# Patient Record
Sex: FEMALE | Race: WHITE | NOT HISPANIC OR LATINO | Employment: OTHER | URBAN - METROPOLITAN AREA
[De-identification: names, ages, dates, MRNs, and addresses within clinical notes are randomized per-mention and may not be internally consistent; named-entity substitution may affect disease eponyms.]

---

## 2021-05-25 ENCOUNTER — RECORDS - HEALTHEAST (OUTPATIENT)
Dept: ADMINISTRATIVE | Facility: CLINIC | Age: 71
End: 2021-05-25

## 2022-11-19 ENCOUNTER — HOSPITAL ENCOUNTER (INPATIENT)
Facility: CLINIC | Age: 72
LOS: 2 days | Discharge: HOME | DRG: 281 | End: 2022-11-21
Attending: INTERNAL MEDICINE | Admitting: INTERNAL MEDICINE
Payer: MEDICARE

## 2022-11-19 ENCOUNTER — APPOINTMENT (OUTPATIENT)
Dept: CARDIOLOGY | Facility: CLINIC | Age: 72
DRG: 281 | End: 2022-11-19
Attending: PHYSICIAN ASSISTANT
Payer: MEDICARE

## 2022-11-19 DIAGNOSIS — I21.4 NSTEMI (NON-ST ELEVATED MYOCARDIAL INFARCTION) (H): ICD-10-CM

## 2022-11-19 DIAGNOSIS — I10 UNCONTROLLED HYPERTENSION: Primary | ICD-10-CM

## 2022-11-19 LAB
ALBUMIN SERPL-MCNC: 3.9 G/DL (ref 3.4–5)
ALP SERPL-CCNC: 86 U/L (ref 40–150)
ALT SERPL W P-5'-P-CCNC: 27 U/L (ref 0–50)
ANION GAP SERPL CALCULATED.3IONS-SCNC: 4 MMOL/L (ref 3–14)
AST SERPL W P-5'-P-CCNC: 19 U/L (ref 0–45)
BILIRUB SERPL-MCNC: 0.2 MG/DL (ref 0.2–1.3)
BUN SERPL-MCNC: 8 MG/DL (ref 7–30)
CALCIUM SERPL-MCNC: 9.5 MG/DL (ref 8.5–10.1)
CHLORIDE BLD-SCNC: 106 MMOL/L (ref 94–109)
CO2 SERPL-SCNC: 29 MMOL/L (ref 20–32)
CREAT SERPL-MCNC: 0.54 MG/DL (ref 0.52–1.04)
ERYTHROCYTE [DISTWIDTH] IN BLOOD BY AUTOMATED COUNT: 12.3 % (ref 10–15)
GFR SERPL CREATININE-BSD FRML MDRD: >90 ML/MIN/1.73M2
GLUCOSE BLD-MCNC: 118 MG/DL (ref 70–99)
HCT VFR BLD AUTO: 41.2 % (ref 35–47)
HGB BLD-MCNC: 14 G/DL (ref 11.7–15.7)
LVEF ECHO: NORMAL
MCH RBC QN AUTO: 31 PG (ref 26.5–33)
MCHC RBC AUTO-ENTMCNC: 34 G/DL (ref 31.5–36.5)
MCV RBC AUTO: 91 FL (ref 78–100)
PLATELET # BLD AUTO: 340 10E3/UL (ref 150–450)
POTASSIUM BLD-SCNC: 4.3 MMOL/L (ref 3.4–5.3)
PROT SERPL-MCNC: 7.8 G/DL (ref 6.8–8.8)
RBC # BLD AUTO: 4.51 10E6/UL (ref 3.8–5.2)
SARS-COV-2 RNA RESP QL NAA+PROBE: NEGATIVE
SODIUM SERPL-SCNC: 139 MMOL/L (ref 133–144)
TROPONIN I SERPL HS-MCNC: 183 NG/L
TROPONIN I SERPL HS-MCNC: 256 NG/L
UFH PPP CHRO-ACNC: 0.13 IU/ML
UFH PPP CHRO-ACNC: 0.39 IU/ML
WBC # BLD AUTO: 7.5 10E3/UL (ref 4–11)

## 2022-11-19 PROCEDURE — 84484 ASSAY OF TROPONIN QUANT: CPT | Performed by: PHYSICIAN ASSISTANT

## 2022-11-19 PROCEDURE — 93010 ELECTROCARDIOGRAM REPORT: CPT | Performed by: INTERNAL MEDICINE

## 2022-11-19 PROCEDURE — 250N000013 HC RX MED GY IP 250 OP 250 PS 637: Performed by: PHYSICIAN ASSISTANT

## 2022-11-19 PROCEDURE — 84484 ASSAY OF TROPONIN QUANT: CPT | Performed by: INTERNAL MEDICINE

## 2022-11-19 PROCEDURE — 255N000002 HC RX 255 OP 636: Performed by: INTERNAL MEDICINE

## 2022-11-19 PROCEDURE — 36415 COLL VENOUS BLD VENIPUNCTURE: CPT | Performed by: INTERNAL MEDICINE

## 2022-11-19 PROCEDURE — 85520 HEPARIN ASSAY: CPT | Performed by: INTERNAL MEDICINE

## 2022-11-19 PROCEDURE — 93005 ELECTROCARDIOGRAM TRACING: CPT

## 2022-11-19 PROCEDURE — 36415 COLL VENOUS BLD VENIPUNCTURE: CPT | Performed by: PHYSICIAN ASSISTANT

## 2022-11-19 PROCEDURE — 210N000002 HC R&B HEART CARE

## 2022-11-19 PROCEDURE — 250N000011 HC RX IP 250 OP 636: Performed by: PHYSICIAN ASSISTANT

## 2022-11-19 PROCEDURE — 99222 1ST HOSP IP/OBS MODERATE 55: CPT | Performed by: INTERNAL MEDICINE

## 2022-11-19 PROCEDURE — 93306 TTE W/DOPPLER COMPLETE: CPT | Mod: 26 | Performed by: INTERNAL MEDICINE

## 2022-11-19 PROCEDURE — 999N000208 ECHOCARDIOGRAM COMPLETE

## 2022-11-19 PROCEDURE — 99223 1ST HOSP IP/OBS HIGH 75: CPT | Mod: AI | Performed by: PHYSICIAN ASSISTANT

## 2022-11-19 PROCEDURE — 85027 COMPLETE CBC AUTOMATED: CPT | Performed by: PHYSICIAN ASSISTANT

## 2022-11-19 PROCEDURE — 80053 COMPREHEN METABOLIC PANEL: CPT | Performed by: PHYSICIAN ASSISTANT

## 2022-11-19 PROCEDURE — U0005 INFEC AGEN DETEC AMPLI PROBE: HCPCS | Performed by: PHYSICIAN ASSISTANT

## 2022-11-19 RX ORDER — ONDANSETRON 4 MG/1
4 TABLET, ORALLY DISINTEGRATING ORAL EVERY 6 HOURS PRN
Status: DISCONTINUED | OUTPATIENT
Start: 2022-11-19 | End: 2022-11-21 | Stop reason: HOSPADM

## 2022-11-19 RX ORDER — CALCIUM CARBONATE 500 MG/1
1000 TABLET, CHEWABLE ORAL 4 TIMES DAILY PRN
Status: DISCONTINUED | OUTPATIENT
Start: 2022-11-19 | End: 2022-11-21 | Stop reason: HOSPADM

## 2022-11-19 RX ORDER — ZINC SULFATE 50(220)MG
220 CAPSULE ORAL DAILY
COMMUNITY

## 2022-11-19 RX ORDER — FLUTICASONE PROPIONATE 50 MCG
2 SPRAY, SUSPENSION (ML) NASAL AT BEDTIME
COMMUNITY

## 2022-11-19 RX ORDER — POLYETHYLENE GLYCOL 3350 17 G/17G
17 POWDER, FOR SOLUTION ORAL DAILY PRN
Status: DISCONTINUED | OUTPATIENT
Start: 2022-11-19 | End: 2022-11-21 | Stop reason: HOSPADM

## 2022-11-19 RX ORDER — PROCHLORPERAZINE MALEATE 5 MG
5 TABLET ORAL EVERY 6 HOURS PRN
Status: DISCONTINUED | OUTPATIENT
Start: 2022-11-19 | End: 2022-11-21 | Stop reason: HOSPADM

## 2022-11-19 RX ORDER — LISINOPRIL 10 MG/1
10 TABLET ORAL DAILY
COMMUNITY

## 2022-11-19 RX ORDER — AMOXICILLIN 250 MG
2 CAPSULE ORAL 2 TIMES DAILY PRN
Status: DISCONTINUED | OUTPATIENT
Start: 2022-11-19 | End: 2022-11-21 | Stop reason: HOSPADM

## 2022-11-19 RX ORDER — MULTIVITAMIN,THERAPEUTIC
1 TABLET ORAL DAILY
COMMUNITY

## 2022-11-19 RX ORDER — ASPIRIN 81 MG/1
81 TABLET ORAL DAILY
Status: DISCONTINUED | OUTPATIENT
Start: 2022-11-20 | End: 2022-11-21 | Stop reason: HOSPADM

## 2022-11-19 RX ORDER — PROCHLORPERAZINE 25 MG
12.5 SUPPOSITORY, RECTAL RECTAL EVERY 12 HOURS PRN
Status: DISCONTINUED | OUTPATIENT
Start: 2022-11-19 | End: 2022-11-21 | Stop reason: HOSPADM

## 2022-11-19 RX ORDER — AMOXICILLIN 500 MG
2400 CAPSULE ORAL DAILY
COMMUNITY

## 2022-11-19 RX ORDER — FLUTICASONE PROPIONATE 50 MCG
2 SPRAY, SUSPENSION (ML) NASAL AT BEDTIME
Status: DISCONTINUED | OUTPATIENT
Start: 2022-11-19 | End: 2022-11-21 | Stop reason: HOSPADM

## 2022-11-19 RX ORDER — ACETAMINOPHEN 650 MG/1
650 SUPPOSITORY RECTAL EVERY 4 HOURS PRN
Status: DISCONTINUED | OUTPATIENT
Start: 2022-11-19 | End: 2022-11-21 | Stop reason: HOSPADM

## 2022-11-19 RX ORDER — SERTRALINE HYDROCHLORIDE 100 MG/1
100 TABLET, FILM COATED ORAL DAILY
Status: DISCONTINUED | OUTPATIENT
Start: 2022-11-20 | End: 2022-11-21 | Stop reason: HOSPADM

## 2022-11-19 RX ORDER — LORAZEPAM 0.5 MG/1
.25-.5 TABLET ORAL EVERY 4 HOURS PRN
Status: DISCONTINUED | OUTPATIENT
Start: 2022-11-19 | End: 2022-11-21 | Stop reason: HOSPADM

## 2022-11-19 RX ORDER — HEPARIN SODIUM 10000 [USP'U]/100ML
0-5000 INJECTION, SOLUTION INTRAVENOUS CONTINUOUS
Status: DISCONTINUED | OUTPATIENT
Start: 2022-11-19 | End: 2022-11-21 | Stop reason: HOSPADM

## 2022-11-19 RX ORDER — LISINOPRIL 10 MG/1
10 TABLET ORAL DAILY
Status: DISCONTINUED | OUTPATIENT
Start: 2022-11-20 | End: 2022-11-21 | Stop reason: HOSPADM

## 2022-11-19 RX ORDER — ASCORBIC ACID 500 MG
1000 TABLET ORAL DAILY
COMMUNITY

## 2022-11-19 RX ORDER — WHEAT DEXTRIN/ASPARTAME 3 G/6 G
POWDER IN PACKET (EA) ORAL DAILY
COMMUNITY

## 2022-11-19 RX ORDER — SERTRALINE HYDROCHLORIDE 100 MG/1
100 TABLET, FILM COATED ORAL DAILY
COMMUNITY

## 2022-11-19 RX ORDER — ONDANSETRON 2 MG/ML
4 INJECTION INTRAMUSCULAR; INTRAVENOUS EVERY 6 HOURS PRN
Status: DISCONTINUED | OUTPATIENT
Start: 2022-11-19 | End: 2022-11-21 | Stop reason: HOSPADM

## 2022-11-19 RX ORDER — LIDOCAINE 40 MG/G
CREAM TOPICAL
Status: DISCONTINUED | OUTPATIENT
Start: 2022-11-19 | End: 2022-11-21 | Stop reason: HOSPADM

## 2022-11-19 RX ORDER — LORATADINE 10 MG/1
10 TABLET ORAL DAILY PRN
COMMUNITY

## 2022-11-19 RX ORDER — AMOXICILLIN 250 MG
1 CAPSULE ORAL 2 TIMES DAILY PRN
Status: DISCONTINUED | OUTPATIENT
Start: 2022-11-19 | End: 2022-11-21 | Stop reason: HOSPADM

## 2022-11-19 RX ORDER — ACETAMINOPHEN 325 MG/1
650 TABLET ORAL EVERY 4 HOURS PRN
Status: DISCONTINUED | OUTPATIENT
Start: 2022-11-19 | End: 2022-11-21 | Stop reason: HOSPADM

## 2022-11-19 RX ORDER — ASPIRIN 81 MG/1
81 TABLET ORAL DAILY
COMMUNITY

## 2022-11-19 RX ADMIN — HUMAN ALBUMIN MICROSPHERES AND PERFLUTREN 9 ML: 10; .22 INJECTION, SOLUTION INTRAVENOUS at 15:07

## 2022-11-19 RX ADMIN — FLUTICASONE PROPIONATE 2 SPRAY: 50 SPRAY, METERED NASAL at 23:29

## 2022-11-19 RX ADMIN — ACETAMINOPHEN 650 MG: 325 TABLET, FILM COATED ORAL at 21:05

## 2022-11-19 RX ADMIN — METOPROLOL TARTRATE 12.5 MG: 25 TABLET, FILM COATED ORAL at 21:05

## 2022-11-19 RX ADMIN — HEPARIN SODIUM 1050 UNITS/HR: 10000 INJECTION, SOLUTION INTRAVENOUS at 16:52

## 2022-11-19 RX ADMIN — ACETAMINOPHEN 650 MG: 325 TABLET, FILM COATED ORAL at 16:18

## 2022-11-19 ASSESSMENT — ACTIVITIES OF DAILY LIVING (ADL)
ADLS_ACUITY_SCORE: 18
ADLS_ACUITY_SCORE: 35
ADLS_ACUITY_SCORE: 33
ADLS_ACUITY_SCORE: 18
ADLS_ACUITY_SCORE: 33
ADLS_ACUITY_SCORE: 33

## 2022-11-19 NOTE — H&P
[facility]    History and Physical  Hospitalist       Date of Admission:  11/19/2022  Date of Service (when I saw the patient): 11/19/22    Assessment & Plan   Heather Yu is a very pleasant 72 year old female with a past medical history significant for dyslipidemia, hypertension, obstructive sleep apnea, palpitations, prediabetes, heart murmur, anxiety with panic disorder, amongst others who presented to an outside hospital emergency department with complaints of palpitations and elevated blood pressure.  Pt woke up early this morning with a sense of palpitations, like her heart was beating hard.  No chest pain.  She checked her BP at home and it was up to 230 systolic, second reading 210 systolic.  She presents to the ED for further evaluation.  Found to have EKG changes with ST depressions in inferior and lateral leads, along with troponin elevation.  Transferred to [facility] for management of NSTEMI    NSTEMI  Hypertensive urgency  Hyperlipidemia  Patient presents with palpitations and elevated blood pressures at home.  Patient's EKG reportedly showed 1 mm ST segment depression in leads II, aVR, V4, V5, V6.  With 1-2 mm ST segment depression in aVF.  Initial troponin negative, up to 112 on repeat.  --Cardiology consulted  -- Trend serial troponins  -- Monitor cardiac telemetry  -- Continue IV heparin drip initiated in ED  -- Aspirin 324 mg given in ER, continue ASA 81 mg daily  -- Metoprolol 12.5 mg twice daily ordered  -- Echocardiogram ordered  -- Repeat EKG  -- Recent lipid panel and hemoglobin A1c values in care everywhere.   --Hemoglobin A1c 5.4% as of 10/26/2022.  , , HDL 45,   -- Resume PTA lisinopril with hold parameter  -- Will have PRN IV hydralazine for SBP >170  -- Oxygen as needed.  Currently not hypoxic.  -- Cardiac diet  -- Likely angiogram on Monday    History of palpitations  History of PVCs  --Monitor on telemetry  --Low-dose  "metoprolol started    Obstructive sleep apnea  -- CPAP while sleeping with home settings  -- Recommend pulse oximetry while sleeping this hospitalization    Generalized anxiety with history of panic disorder  Patient currently appears to be slightly anxious, which is appropriate given the events of today.  She otherwise seems to be compensating.  -- Resume PTA Zoloft  -- Ativan p.o. ordered as needed while in hospital    Prediabetes  Hemoglobin A1c 5.4% as of 10/26/2022  --Monitor routinely with PCP      Diet: Low Saturated Fat Na <2400 mg    DVT Prophylaxis: Heparin gtt  Bourne Catheter: Not present  Central Lines: None  Cardiac Monitoring: ACTIVE order. Indication: AMI (NSTEMI/ STEMI) (48 hours)  Code Status: Full Code      Disposition Plan      Expected Discharge Date: 11/21/2022                Inpatient status, anticipate at least 2 nights of hospitalization for cardiac work-up of NSTEMI, and patient will likely need angiogram which would not be available until Monday.      Clinically Significant Risk Factors Present on Admission                  # Hypertension: home medication list includes antihypertensive(s)     # Obesity: Estimated body mass index is 30.19 kg/m  as calculated from the following:    Height as of this encounter: 1.6 m (5' 3\").    Weight as of this encounter: 77.3 kg (170 lb 6.4 oz).           The patient has been discussed with Dr. Chinchilla, who agrees with the assessment and plan at this time.     Securely message with the Vocera Web Console (learn more here)  Text page via [facility] Paging/Directory         WICHO Ferrer    Primary Care Physician   *No primary care provider on file.    Chief Complaint   Palpitations    History is obtained from the patient as well as ER provider documentation.    History of Present Illness   Heather Yu is a very pleasant 72 year old female with a past medical history significant for dyslipidemia, hypertension, obstructive sleep apnea, palpitations, " prediabetes, heart murmur, anxiety with panic disorder, amongst others who presented to an outside hospital emergency department with complaints of palpitations and elevated blood pressure.  Patient has a longstanding history of PVCs, and it is not unusual for her to wake up in the early morning with a sense of palpitations.  Patient states that it feels like her heart is beating very hard, but not irregularly.  This morning she took her blood pressure at home and it was up to 230 systolic, second reading 210 systolic.  With readings that high she decided to come to the ED for evaluation.  She denied having any chest pain, shortness of breath, headache, diaphoresis, numbness or tingling.  She denies any abdominal pain, nausea, vomiting, diarrhea, recent fever or flulike symptoms.  She does have a history of palpitations and had echocardiogram completed in the spring 2022 due to heart murmur, there were no structural abnormalities and the EF was found to be 55%.  Patient also has a known history of anxiety and has had frequent visits to the clinic for various concerns related to this per outside reports.    In the ED, patient was found to have BP of 190/87, heart rate 78, normal temperature.  O2 saturation 90% on RA.  Repeat /78.  Initial EKG showed some widespread ST depression in the inferior and lateral leads compared to previous EKG of June 2021.  Those depressions were present at the time but were called as ST abnormality.  They appear slightly more prominent with today's EKG per ER provider report.  Initial troponin negative.  Glucose 108, otherwise normal electrolytes and renal function.  CBC showed normal hemoglobin, platelets without elevation, normal WBC.  Her second troponin returned elevated at 112.  Repeat EKG appeared unchanged from previous.  Aspirin 324 mg given.  Patient received her regular a.m. meds.  She was started on a heparin drip.    EKG is not available for my review, but at 0830  reported to show 1 mm ST segment depression in leads II, aVR, V4, V5, V6.  With 1-2 mm ST segment depression in aVF.  Cardiology was contacted who recommended transfer for likely angiography and further cardiac monitoring.    On arrival to [facility], patient is asymptomatic.  Her BP is improved significantly to 141/67, heart rate 70, temperature 98.2, satting at 97% on room air.    Past Medical History    I have reviewed this patient's medical history and updated it with pertinent information if needed.   Name Status Onset Date Source     History of Cervical Conization Active 01/01/1993      Pain in Left Knee Active 10/24/2018      Osteopenia Active 12/05/2018      Prediabetes Active 01/11/2021      Cramp in Lower Limb Active 01/23/2021      Choking Unknown 04/14/2021 External    Glaucoma Active 06/25/2021      Palpitations Active 06/27/2021 External    Pain in Left Lower Limb Unknown 07/04/2021 External    Panic Disorder Active 02/01/2022      Systolic Murmur Active 05/18/2022      Supraspinatus Tear Active 08/09/2022      Reducible Umbilical Hernia Active 08/22/2022      Verruca Vulgaris Unknown        Dyslipidemia Active        Overweight Active        Anxiety Active        Obstructive Sleep Apnea Syndrome Active        Hypertensive Disorder Active        Allergic Rhinitis Active        Gastroesophageal Reflux Disease Active        Seborrheic Dermatitis of Scalp Active        Degeneration of Intervertebral Disc Active        Plantarflexion Deformity of Foot Active             Past Surgical History   I have reviewed this patient's surgical history and updated it with pertinent information if needed.  Procedures    Procedures  Date Name Performed by     06/04/2013 Colonoscopy  Notes:  10 year repeat. Information not available    01/01/1993 Colposcopy Information not available      Tubal Ligation  Notes:  1982 Information not available      Total Knee Arthroplasty  Notes:  right         Social  History   I have reviewed this patient's social history and updated it with pertinent information if needed.  Patient lives independently.  She is a lifelong non-smoker.  She does not drink alcohol.  She denies any illicit drug use.       Family History   I have reviewed this patient's family history and updated it with pertinent information if needed.   This was reviewed with the patient and noncontributory to this presentation.    Medications   Prior to Admission Medications   Prescriptions Last Dose Informant Patient Reported? Taking?   Cranberry 500 MG TABS 11/18/2022 Self Yes Yes   Sig: Take 500 mg by mouth daily   Omega-3 Fatty Acids (FISH OIL) 1200 MG capsule 11/18/2022 Self Yes Yes   Sig: Take 2,400 mg by mouth daily   aspirin 81 MG EC tablet 11/19/2022 at 0800 Self Yes Yes   Sig: Take 81 mg by mouth daily   bulk laxative (BENEFIBER DRINK MIX) packet 11/18/2022 Self Yes Yes   Sig: Take by mouth daily 1.5 teaspoonfuls   cholecalciferol (VITAMIN D3) 25 mcg (1000 units) capsule 11/18/2022 Self Yes Yes   Sig: Take 1 capsule by mouth daily   fluticasone (FLONASE) 50 MCG/ACT nasal spray 11/18/2022 at hs Self Yes Yes   Sig: Spray 2 sprays into both nostrils At Bedtime   lisinopril (ZESTRIL) 10 MG tablet 11/19/2022 Self Yes Yes   Sig: Take 10 mg by mouth daily   loratadine (CLARITIN) 10 MG tablet 11/18/2022 Self Yes Yes   Sig: Take 10 mg by mouth daily as needed for allergies   multivitamin, therapeutic (THERA-VIT) TABS tablet 11/18/2022 Self Yes Yes   Sig: Take 1 tablet by mouth daily   sertraline (ZOLOFT) 100 MG tablet 11/19/2022 Self Yes Yes   Sig: Take 100 mg by mouth daily   vitamin C (ASCORBIC ACID) 500 MG tablet 11/18/2022 Self Yes Yes   Sig: Take 1,000 mg by mouth daily   zinc sulfate (ZINCATE) 220 (50 Zn) MG capsule 11/18/2022 Self Yes Yes   Sig: Take 220 mg by mouth daily (50 mg elemental zinc)      Facility-Administered Medications: None     Allergies   Allergies   Allergen Reactions     Cephalexin  Itching     Ibuprofen GI Disturbance       Review of Systems   The 10 point Review of Systems is negative other than noted in the HPI.    Physical Exam   Temp: 98.2  F (36.8  C) Temp src: Oral BP: (!) 142/63 Pulse: 74     SpO2: 97 %      Vital Signs with Ranges  Temp:  [98.2  F (36.8  C)] 98.2  F (36.8  C)  Pulse:  [70-74] 74  BP: (141-142)/(63-67) 142/63  SpO2:  [97 %] 97 %  170 lbs 6.4 oz    Constitutional: Well-appearing adult female.  Lying in bed, awake, alert, cooperative, no apparent distress.    ENT: Normocephalic, without obvious abnormality, atraumatic, oropharynx with moist mucus membranes.  Eyes pupils are equal, round; extra occular movements grossly intact.  Normal sclera.    Neck: Supple, symmetrical.  Pulmonary: No increased work of breathing, good air exchange, clear to auscultation bilaterally, no crackles or wheezing.  Cardiovascular: Regular rate and rhythm, normal S1 and S2, grade 2/6 systolic murmur.  Distal pulses intact in all 4.  GI: Normal bowel sounds, soft, non-distended, non-tender.    Skin/Integumen: Warm, dry, no rashes on exposed skin.  Neuro: CN II-XII grossly intact.  Speech normal, no facial droop.  Moves all 4 extremities equally with normal strength.  Psych:  Alert and oriented to self, place, date, situation.  Appears slightly anxious, with normal affect.  Extremities: No lower extremity edema noted, limbs atraumatic.      Data   Data reviewed today:  I personally reviewed all labs and imaging reports sent over from outside hospital.  No lab results found in last 7 days.    Results for orders placed or performed during the hospital encounter of 11/19/22 (from the past 24 hour(s))   EKG 12-lead, tracing only   Result Value Ref Range    Systolic Blood Pressure  mmHg    Diastolic Blood Pressure  mmHg    Ventricular Rate 78 BPM    Atrial Rate 78 BPM    FL Interval 148 ms    QRS Duration 78 ms     ms    QTc 440 ms    P Axis 66 degrees    R AXIS 74 degrees    T Axis 71 degrees     Interpretation ECG       Sinus rhythm  Minimal voltage criteria for LVH, may be normal variant  Nonspecific ST abnormality  Abnormal ECG  No previous ECGs available       CBC, CMP, repeat troponin level, echocardiogram, and EKG results are pending

## 2022-11-19 NOTE — PROVIDER NOTIFICATION
Notified Person Name: Dr. Madrigal    Notification Date/Time: 11/19/22 1446    Notification Interaction: paged with callback    Purpose of Notification: Elevated Troponin    Orders Received: Continue to monitor

## 2022-11-19 NOTE — PLAN OF CARE
Patient arrived in the CCU at 1300 this afternoon. She arrived with no complaints of chest pain or palpatations. Vitals stable, and on room air . Patient's daughter Sylvia was updated with the plan and did visit today. Patient did get more anxious as the day went on and was offered Ativan but she declined it. Plan for Renal US tomorrow and possible discharge.

## 2022-11-19 NOTE — CONSULTS
[facility]    Cardiology Consultation     Date of Admission:  11/19/2022    Assessment & Plan   Heather Yu is a 72 year old female who was admitted on 11/19/2022.    Assessment:  1.  Raised troponin in the setting of palpitations and significantly raised blood pressure.  History of ST and T wave changes but those EKGs are not available to me.  No chest discomfort.  EKG here shows nonspecific upsloping ST changes in the inferior leads with criteria for LVH.  Could represent an acute coronary syndrome except the patient has no chest discomfort.  Troponin could be raised merely because of the markedly raised blood pressure.  2.  Hypertension.  Can achieve high levels of blood pressure.  Rule out renovascular disease.  3.  Aortic murmur.  4.  Marked anxiety.    Plan:  1.  Fully agree with aspirin and intravenous heparin as well as Metroprolol tartrate.  2.  We will await the results of the echocardiogram.  3.  We will trend the cardiac enzymes.  4.  We will obtain renal ultrasound to determine if there is any evidence of renal artery stenosis or fibromuscular dysplasia of the renal arteries given the very high blood pressure.  5.  When those results come in we will decide whether to proceed ahead with a stress nuclear scan or to coronary angiography.  6.  We will follow rhythm on telemetry but if nothing found here would have the patient wear a Zio patch monitor at home to try and  rhythm abnormalities given the symptoms of palpitations.    Will follow      Meño Madrigal MD, MD, [facility]    Primary Care Physician   No primary care provider on file.    Reason for Consult   Reason for consult: I was asked by hospitalist service to evaluate this patient for non-ST elevation myocardial infarct..    History of Present Illness   Heather Yu is a 72 year old female who presents with history of waking up with her heart beating strongly and regularly.  She then checked her  blood pressure on her blood pressure monitor which she says reads high and her blood pressure was around the 230 systolic vamshi.  Because of that she went to the emergency room and her systolic pressure was 190 and gradually dropped down into the 170s and is now 142 here.  This patient did not have any chest pains or chest pressure or unusual shortness of breath or arm jaw or throat discomfort.  There was description of abnormal ST and T wave changes on EKG but I do not have that EKG available to me to review.  Her initial troponin was normal but then a second troponin was raised to 112.  In the emergency room they felt that her EKG did show widespread ST depression in the inferior and lateral leads which are unchanged from the previous EKG reportedly in June 2021 but appeared more slightly prominent.  Again, those EKGs are not available to me.  The patient was started on intravenous heparin and aspirin.  Patient is pain-free today in the coronary intensive care unit.  Her rhythm is normal sinus rhythm.    Past Medical History   1.  Prediabetes  2.  Glaucoma  3.  Panic disorder  4.  Anxiety  5.  Systolic murmur  6.  Supra spinatus tear  7.  Umbilical hernia  8.  Obstructive sleep apnea  9.  Essential hypertension  10.  Allergic rhinitis  11.  Gastroesophageal reflux disease  13 degenerative intervertebral disc  14 seborrheic dermatitis of scalp  15 plantar flexion deformity of foot  16 cervical conization.  17 hyperlipidemia.    Past Surgical History   Colonoscopy  Colposcopy  Tubal ligation  Total knee arthroplasty on the right    Prior to Admission Medications   Prior to Admission Medications   Prescriptions Last Dose Informant Patient Reported? Taking?   Cranberry 500 MG TABS 11/18/2022 Self Yes Yes   Sig: Take 500 mg by mouth daily   Omega-3 Fatty Acids (FISH OIL) 1200 MG capsule 11/18/2022 Self Yes Yes   Sig: Take 2,400 mg by mouth daily   aspirin 81 MG EC tablet 11/19/2022 at 0800 Self Yes Yes   Sig: Take 81 mg  by mouth daily   bulk laxative (BENEFIBER DRINK MIX) packet 2022 Self Yes Yes   Sig: Take by mouth daily 1.5 teaspoonfuls   cholecalciferol (VITAMIN D3) 25 mcg (1000 units) capsule 2022 Self Yes Yes   Sig: Take 1 capsule by mouth daily   fluticasone (FLONASE) 50 MCG/ACT nasal spray 2022 at hs Self Yes Yes   Sig: Spray 2 sprays into both nostrils At Bedtime   lisinopril (ZESTRIL) 10 MG tablet 2022 Self Yes Yes   Sig: Take 10 mg by mouth daily   loratadine (CLARITIN) 10 MG tablet 2022 Self Yes Yes   Sig: Take 10 mg by mouth daily as needed for allergies   multivitamin, therapeutic (THERA-VIT) TABS tablet 2022 Self Yes Yes   Sig: Take 1 tablet by mouth daily   sertraline (ZOLOFT) 100 MG tablet 2022 Self Yes Yes   Sig: Take 100 mg by mouth daily   vitamin C (ASCORBIC ACID) 500 MG tablet 2022 Self Yes Yes   Sig: Take 1,000 mg by mouth daily   zinc sulfate (ZINCATE) 220 (50 Zn) MG capsule 2022 Self Yes Yes   Sig: Take 220 mg by mouth daily (50 mg elemental zinc)      Facility-Administered Medications: None     Current Facility-Administered Medications   Medication Dose Route Frequency     [START ON 2022] aspirin  81 mg Oral Daily     fluticasone  2 spray Both Nostrils At Bedtime     [START ON 2022] lisinopril  10 mg Oral Daily     metoprolol tartrate  12.5 mg Oral BID     [START ON 2022] sertraline  100 mg Oral Daily     sodium chloride (PF)  3 mL Intracatheter Q8H     Current Facility-Administered Medications   Medication Last Rate     heparin       - MEDICATION INSTRUCTIONS -       Allergies   Allergies   Allergen Reactions     Cephalexin Itching     Ibuprofen GI Disturbance       Social History    Is a domestic partner for 20 years.  Never smoked cigarettes.  Does not drink alcohol.      Family History   Father had coronary atherosclerosis and hyperlipidemia.  Mother had coronary artery bypass grafting but unfortunately  during the  "operation.  Sister: History of malignant neoplasm of the skin  Sister: Leukemia  Sister: Diabetes mellitus  Grandmother: Cancer of the colon    Review of Systems   A comprehensive review of system was performed and is negative other than that noted in the HPI or here.     Physical Exam   Vital Signs with Ranges  Temp:  [98.2  F (36.8  C)] 98.2  F (36.8  C)  Pulse:  [70-74] 74  BP: (141-142)/(63-67) 142/63  SpO2:  [97 %] 97 %  Wt Readings from Last 4 Encounters:   11/19/22 77.3 kg (170 lb 6.4 oz)     No intake/output data recorded.      Vitals: BP (!) 142/63 (BP Location: Left arm)   Pulse 74   Temp 98.2  F (36.8  C) (Oral)   Ht 1.6 m (5' 3\")   Wt 77.3 kg (170 lb 6.4 oz)   SpO2 97%   BMI 30.19 kg/m      Physical Exam:   General - Alert and oriented to time place and person in no acute distress  Eyes - No scleral icterus  HEENT - Neck supple, moist mucous membranes  Cardiovascular -heart sounds 1 and 2 are normal.  There is a 2/6 systolic ejection murmur heard in the aortic area.  No radiation.  [place] beat is in palpable.  Extremities - There is no peripheral edema   Respiratory -symmetrical expansion of the chest.  Chest is clear to percussion auscultation with no added sounds.  Skin - No pallor or cyanosis  Gastrointestinal - Non tender and non distended without rebound or guarding  Psych - Appropriate affect   Neurological - No gross motor neurological focal deficits    No lab results found in last 7 days.    Invalid input(s): TROPONINIES    No lab results found in last 7 days.  No results for input(s): CHOL, HDL, LDL, TRIG, CHOLHDLRATIO in the last 21432 hours.  No results for input(s): WBC, HGB, HCT, MCV, PLT, IRON, IRONSAT, RETICABSCT, RETP, FEB, DENNIS, B12, FOLIC, EPOE, MORPH in the last 168 hours.  No results for input(s): PH, PHV, PO2, PO2V, SAT, PCO2, PCO2V, HCO3, HCO3V in the last 168 hours.  No results for input(s): NTBNPI, NTBNP in the last 168 hours.  No results for input(s): DD in the last 168 " "hours.  No results for input(s): SED, CRP in the last 168 hours.  No results for input(s): PLT in the last 168 hours.  No results for input(s): TSH in the last 168 hours.  No results for input(s): COLOR, APPEARANCE, URINEGLC, URINEBILI, URINEKETONE, SG, UBLD, URINEPH, PROTEIN, UROBILINOGEN, NITRITE, LEUKEST, RBCU, WBCU in the last 168 hours.    Imaging:  No results found for this or any previous visit (from the past 48 hour(s)).    Echo:  No results found for this or any previous visit (from the past 4320 hour(s)).    Clinically Significant Risk Factors Present on Admission                  # Obesity: Estimated body mass index is 30.19 kg/m  as calculated from the following:    Height as of this encounter: 1.6 m (5' 3\").    Weight as of this encounter: 77.3 kg (170 lb 6.4 oz).                                                      "

## 2022-11-19 NOTE — PHARMACY-ADMISSION MEDICATION HISTORY
Pharmacy Medication History  Admission medication history interview status for the 11/19/2022  admission is complete. See EPIC admission navigator for prior to admission medications     Location of Interview: Patient room  Medication history sources: Patient and Surescripts    In the past week, patient estimated taking medication this percent of the time: greater than 90%    Medication reconciliation completed by provider prior to medication history? No    Time spent in this activity: 20 minutes    Prior to Admission medications    Medication Sig Last Dose Taking? Auth Provider Long Term End Date   aspirin 81 MG EC tablet Take 81 mg by mouth daily 11/19/2022 at 0800 Yes Unknown, Entered By History     bulk laxative (BENEFIBER DRINK MIX) packet Take by mouth daily 1.5 teaspoonfuls 11/18/2022 Yes Unknown, Entered By History     cholecalciferol (VITAMIN D3) 25 mcg (1000 units) capsule Take 1 capsule by mouth daily 11/18/2022 Yes Unknown, Entered By History     Cranberry 500 MG TABS Take 500 mg by mouth daily 11/18/2022 Yes Unknown, Entered By History     fluticasone (FLONASE) 50 MCG/ACT nasal spray Spray 2 sprays into both nostrils At Bedtime 11/18/2022 at hs Yes Unknown, Entered By History     lisinopril (ZESTRIL) 10 MG tablet Take 10 mg by mouth daily 11/19/2022 Yes Unknown, Entered By History Yes    loratadine (CLARITIN) 10 MG tablet Take 10 mg by mouth daily as needed for allergies 11/18/2022 Yes Unknown, Entered By History     multivitamin, therapeutic (THERA-VIT) TABS tablet Take 1 tablet by mouth daily 11/18/2022 Yes Unknown, Entered By History     Omega-3 Fatty Acids (FISH OIL) 1200 MG capsule Take 2,400 mg by mouth daily 11/18/2022 Yes Unknown, Entered By History     sertraline (ZOLOFT) 100 MG tablet Take 100 mg by mouth daily 11/19/2022 Yes Unknown, Entered By History Yes    vitamin C (ASCORBIC ACID) 500 MG tablet Take 1,000 mg by mouth daily 11/18/2022 Yes Unknown, Entered By History     zinc sulfate  (ZINCATE) 220 (50 Zn) MG capsule Take 220 mg by mouth daily (50 mg elemental zinc) 11/18/2022 Yes Unknown, Entered By History         The information provided in this note is only as accurate as the sources available at the time of update(s)

## 2022-11-20 ENCOUNTER — APPOINTMENT (OUTPATIENT)
Dept: ULTRASOUND IMAGING | Facility: CLINIC | Age: 72
DRG: 281 | End: 2022-11-20
Attending: INTERNAL MEDICINE
Payer: MEDICARE

## 2022-11-20 LAB
ANION GAP SERPL CALCULATED.3IONS-SCNC: 7 MMOL/L (ref 3–14)
BUN SERPL-MCNC: 11 MG/DL (ref 7–30)
CALCIUM SERPL-MCNC: 8.7 MG/DL (ref 8.5–10.1)
CHLORIDE BLD-SCNC: 108 MMOL/L (ref 94–109)
CO2 SERPL-SCNC: 26 MMOL/L (ref 20–32)
CREAT SERPL-MCNC: 0.53 MG/DL (ref 0.52–1.04)
ERYTHROCYTE [DISTWIDTH] IN BLOOD BY AUTOMATED COUNT: 12.3 % (ref 10–15)
GFR SERPL CREATININE-BSD FRML MDRD: >90 ML/MIN/1.73M2
GLUCOSE BLD-MCNC: 103 MG/DL (ref 70–99)
HCT VFR BLD AUTO: 36 % (ref 35–47)
HGB BLD-MCNC: 12.4 G/DL (ref 11.7–15.7)
MCH RBC QN AUTO: 31.3 PG (ref 26.5–33)
MCHC RBC AUTO-ENTMCNC: 34.4 G/DL (ref 31.5–36.5)
MCV RBC AUTO: 91 FL (ref 78–100)
PLATELET # BLD AUTO: 281 10E3/UL (ref 150–450)
POTASSIUM BLD-SCNC: 3.8 MMOL/L (ref 3.4–5.3)
RBC # BLD AUTO: 3.96 10E6/UL (ref 3.8–5.2)
SODIUM SERPL-SCNC: 141 MMOL/L (ref 133–144)
TROPONIN I SERPL HS-MCNC: 123 NG/L
UFH PPP CHRO-ACNC: 0.43 IU/ML
WBC # BLD AUTO: 7.1 10E3/UL (ref 4–11)

## 2022-11-20 PROCEDURE — 250N000013 HC RX MED GY IP 250 OP 250 PS 637: Performed by: PHYSICIAN ASSISTANT

## 2022-11-20 PROCEDURE — 210N000002 HC R&B HEART CARE

## 2022-11-20 PROCEDURE — 250N000013 HC RX MED GY IP 250 OP 250 PS 637: Performed by: INTERNAL MEDICINE

## 2022-11-20 PROCEDURE — 85520 HEPARIN ASSAY: CPT | Performed by: INTERNAL MEDICINE

## 2022-11-20 PROCEDURE — 93975 VASCULAR STUDY: CPT

## 2022-11-20 PROCEDURE — 99232 SBSQ HOSP IP/OBS MODERATE 35: CPT | Performed by: INTERNAL MEDICINE

## 2022-11-20 PROCEDURE — 85027 COMPLETE CBC AUTOMATED: CPT | Performed by: PHYSICIAN ASSISTANT

## 2022-11-20 PROCEDURE — 80048 BASIC METABOLIC PNL TOTAL CA: CPT | Performed by: PHYSICIAN ASSISTANT

## 2022-11-20 PROCEDURE — 250N000011 HC RX IP 250 OP 636: Performed by: PHYSICIAN ASSISTANT

## 2022-11-20 PROCEDURE — 36415 COLL VENOUS BLD VENIPUNCTURE: CPT | Performed by: INTERNAL MEDICINE

## 2022-11-20 PROCEDURE — 99233 SBSQ HOSP IP/OBS HIGH 50: CPT | Performed by: INTERNAL MEDICINE

## 2022-11-20 RX ORDER — METOPROLOL TARTRATE 25 MG/1
25 TABLET, FILM COATED ORAL 2 TIMES DAILY
Status: DISCONTINUED | OUTPATIENT
Start: 2022-11-20 | End: 2022-11-20

## 2022-11-20 RX ORDER — METOPROLOL TARTRATE 50 MG
50 TABLET ORAL 2 TIMES DAILY
Status: DISCONTINUED | OUTPATIENT
Start: 2022-11-20 | End: 2022-11-20

## 2022-11-20 RX ORDER — METOPROLOL TARTRATE 25 MG/1
25 TABLET, FILM COATED ORAL 2 TIMES DAILY
Status: DISCONTINUED | OUTPATIENT
Start: 2022-11-20 | End: 2022-11-21

## 2022-11-20 RX ADMIN — ASPIRIN 81 MG: 81 TABLET, COATED ORAL at 08:02

## 2022-11-20 RX ADMIN — HEPARIN SODIUM 1050 UNITS/HR: 10000 INJECTION, SOLUTION INTRAVENOUS at 17:31

## 2022-11-20 RX ADMIN — METOPROLOL TARTRATE 25 MG: 25 TABLET, FILM COATED ORAL at 20:48

## 2022-11-20 RX ADMIN — SERTRALINE HYDROCHLORIDE 100 MG: 100 TABLET ORAL at 08:02

## 2022-11-20 RX ADMIN — LISINOPRIL 10 MG: 10 TABLET ORAL at 08:02

## 2022-11-20 RX ADMIN — FLUTICASONE PROPIONATE 2 SPRAY: 50 SPRAY, METERED NASAL at 20:48

## 2022-11-20 ASSESSMENT — ACTIVITIES OF DAILY LIVING (ADL)
ADLS_ACUITY_SCORE: 18

## 2022-11-20 NOTE — PROGRESS NOTES
SPIRITUAL HE[facility] Progress Note  SH  254    Visit with Alina per  request.  I introduced myself and [facility] services.    She was very pleasant though seemed a bit nervous.  Asked for prayer that she might be able to go home soon and be healthy.    No other needs expressed at this time.      [facility] remains available for any other needs.    Susan Nolen MA  Associate   [phone] - pager

## 2022-11-20 NOTE — CONSULTS
"CLINICAL NUTRITION SERVICES BRIEF NOTE  Nutrition Admission Risk Screen Received -   Have you recently lost weight without trying in the last 6 months ? - \"unsure\"  Have you been eating poorly due to a decreased appetite ?- \"no\"    New Findings  - Pt seen in room re: unsure wt loss screen. She reports intentional weight loss of about 20-30# in the past year, from working with a dietitian. She denies any trouble with appetite, or ongoing unintended wt loss.     Intervention  - Defer assessment given intentional weight loss.   - Pt did inquire about a heart healthy diet, handouts attached to discharge paperwork.     Dena Finley RD, LD  [facility], [facility]  Pager: [phone]  Weekend Pager: [phone]      "

## 2022-11-20 NOTE — PLAN OF CARE
[id number]: A&Ox4, very anxious, rambling speech. Declines prn ativan. VSS on RA. Tele SR. Denies CP or shortness of breath. LS clear. Heparin infusing. Trop peaked at 256. Up independently. NPO @ 0000. Plan for renal US 11-20.

## 2022-11-20 NOTE — PLAN OF CARE
Alert and oriented x 4. VS stable, BP elevated in the 160's, on RA and no complaints of pain this morning. She is up ambulating in the room indep safely. Tele SR with rates in the 80's. Patient was fairly anxious about moving rooms, CT angio and renal US today but declined ativan. I updated daughter Sylvia with room change and plan.

## 2022-11-20 NOTE — PROGRESS NOTES
[facility]    Medicine Progress Note - Hospitalist Service    Date of Admission:  11/19/2022    Assessment & Plan   MECHELLE Yu is a very pleasant 72 year old female with a past medical history significant for dyslipidemia, hypertension, obstructive sleep apnea, palpitations, prediabetes, heart murmur, anxiety with panic disorder, amongst others who presented to an outside hospital emergency department with complaints of palpitations and elevated blood pressure.  Pt woke up early with a sense of palpitations, like her heart was beating hard.  No chest pain.  She checked her BP at home and it was in the 200s systolic.  She presented to the ED for further evaluation.  Reportedly had EKG changes with ST depressions in inferior and lateral leads, along with troponin elevation.  Transferred to [facility] for management of NSTEMI     NSTEMI  Hypertensive urgency  Hyperlipidemia  Patient presented with palpitations and elevated blood pressures at home.  Patient's EKG reportedly showed 1 mm ST segment depression in leads II, aVR, V4, V5, V6.  With 1-2 mm ST segment depression in aVF.  Initial troponin negative, up to 112 on repeat.  -Troponin peaked to 286 and has started downtrending  - Cardiology following, appreciate input  -Echocardiogram with normal EF, no wall motion abnormality  -Patient was given option of coronary angiogram versus CT coronaries, patient chose CT coronaries, plan for tomorrow  -Continue to monitor cardiac telemetry  -Continue IV heparin drip initiated on admission  -- Continue ASA 81 mg daily, metoprolol 25 mg twice daily and lisinopril 10 mg daily  -Renal artery ultrasound negative for any obstruction  -- Recent lipid panel and hemoglobin A1c values in care everywhere.              --Hemoglobin A1c 5.4% as of 10/26/2022.  , , HDL 45,   -- Will have PRN IV hydralazine for SBP >170  -- Oxygen as needed.  Currently not hypoxic.  -- Cardiac  "diet     History of palpitations  History of PVCs  --Monitor on telemetry  -Continue metoprolol as above     Obstructive sleep apnea  -- CPAP while sleeping with home settings  -- Recommend pulse oximetry while sleeping this hospitalization     Generalized anxiety with history of panic disorder  Patient intermittently appears to be slightly anxious,   -Refusing as needed benzodiazepine, offered her low-dose Seroquel which currently she is refusing  -- Continue PTA Zoloft     Prediabetes  Hemoglobin A1c 5.4% as of 10/26/2022  --Monitor routinely with PCP      Diet: Low Saturated Fat Diet    DVT Prophylaxis: Pneumatic Compression Devices  Bourne Catheter: Not present  Central Lines: None  Cardiac Monitoring: ACTIVE order. Indication: AMI (NSTEMI/ STEMI) (48 hours)  Code Status: Full Code      Disposition Plan      Expected Discharge Date: 11/22/2022                The patient's care was discussed with the Bedside Nurse and Patient.    Ele Chinchilla MD  Hospitalist Service  [facility]  Securely message with the Vocera Web Console (learn more here)  Text page via [employer] Paging/Directory         Clinically Significant Risk Factors Present on Admission                  # Hypertension: home medication list includes antihypertensive(s)     # Obesity: Estimated body mass index is 30.31 kg/m  as calculated from the following:    Height as of this encounter: 1.6 m (5' 3\").    Weight as of this encounter: 77.6 kg (171 lb 1.6 oz).           ______________________________________________________________________    Interval History   Patient continues to be slightly anxious but feels she is better this afternoon.  Denied any chest pain, shortness of breath or dizziness.  Denied any new complaints.  No new nursing concerns.    Data reviewed today: I reviewed all medications, new labs and imaging results over the last 24 hours.     Physical Exam   Vital Signs: Temp: 98.1  F (36.7  C) Temp src: Oral BP: (!) " 168/74 Pulse: 68   Resp: 16 SpO2: 97 % O2 Device: None (Room air)    Weight: 171 lbs 1.6 oz  Exam:  Constitutional: Awake, alert and no distress. Appears comfortable  Head: Normocephalic. No masses, lesions, tenderness or abnormalities  ENT: ENT exam normal, no neck nodes or sinus tenderness  Cardiovascular: RRR.  no murmurs, no rubs or JVD  Respiratory:normal WOB,b/l equal air entry, no wheezes or crackles   Gastrointestinal: Abdomen soft, non-tender. BS normal. No masses, organomegaly  : Deferred  Extremities :no edema , no clubbing or cyanosis    Neurologic: Cranial nerves II-XII grossly intact , power symmetrical, Reflexes normal and symmetric. Sensation grossly WNL.      Data   Recent Labs   Lab 22  0552 22  1517   WBC 7.1 7.5   HGB 12.4 14.0   MCV 91 91    340    139   POTASSIUM 3.8 4.3   CHLORIDE 108 106   CO2 26 29   BUN 11 8   CR 0.53 0.54   ANIONGAP 7 4   KENNETH 8.7 9.5   * 118*   ALBUMIN  --  3.9   PROTTOTAL  --  7.8   BILITOTAL  --  0.2   ALKPHOS  --  86   ALT  --  27   AST  --  19     Recent Results (from the past 24 hour(s))   Echocardiogram Complete   Result Value    LVEF  60-65%    Narrative    [id number]  [id number]  [id number]  [id number]^^FLOWER^JEANA     [facility]  Echocardiography Laboratory  [address] [place] [address]     Name: CARMELO MADISON  MRN: [id number]  : 1950  Study Date: 2022 02:27 PM  Age: 72 yrs  Gender: Female  Patient Location: [facility]  Reason For Study: MI - Acute  Ordering Physician: FLOWER ALANIS  Referring Physician: SYSTEM, PROVIDER NOT IN  Performed By: Jen Lundy     BSA: 1.8 m2  Height: 63 in  Weight: 170 lb  HR: 81  BP: 142/63 mmHg  ______________________________________________________________________________  Procedure  Complete Portable Echo Adult. Optison (NDC #[id number]) given  intravenously.  ______________________________________________________________________________  Interpretation Summary     The visual ejection fraction is 60-65%.  Left ventricular systolic function is normal.  No regional wall motion abnormalities noted.  The study was technically difficult.  ______________________________________________________________________________  Left Ventricle  The left ventricle is normal in size. There is normal left ventricular wall  thickness. Diastolic Doppler findings (E/E' ratio and/or other parameters)  suggest left ventricular filling pressures are indeterminate. The visual  ejection fraction is 60-65%. Left ventricular systolic function is normal.  Grade I or early diastolic dysfunction. No regional wall motion abnormalities  noted.     Right Ventricle  The right ventricle is normal in size and function.     Atria  Normal left atrial size. Right atrial size is normal. There is no color  Doppler evidence of an atrial shunt.     Mitral Valve  There is trace mitral regurgitation.     Tricuspid Valve  There is trace tricuspid regurgitation. The right ventricular systolic  pressure is approximated at 22.8 mmHg plus the right atrial pressure.     Aortic Valve  The aortic valve is trileaflet. There is moderate trileaflet aortic sclerosis.  No aortic regurgitation is present. No hemodynamically significant valvular  aortic stenosis.     Pulmonic Valve  There is no pulmonic valvular regurgitation. Normal pulmonic valve velocity.     Vessels  The aortic root is normal size. Normal size ascending aorta. IVC diameter <2.1  cm collapsing >50% with sniff suggests a normal RA pressure of 3 mmHg.     Pericardium  There is no pericardial effusion.     Rhythm  Sinus rhythm was noted.  ______________________________________________________________________________  MMode/2D Measurements & Calculations  IVSd: 0.97 cm     LVIDd: 4.7 cm  LVIDs: 2.9 cm  LVPWd: 0.93 cm  FS: 38.6 %  LV mass(C)d: 153.3  grams  LV mass(C)dI: 84.9 grams/m2  Ao root diam: 2.8 cm  LA dimension: 2.7 cm  asc Aorta Diam: 2.5 cm  LA/Ao: 0.97  LVOT diam: 2.0 cm  LVOT area: 3.3 cm2  LA Volume (BP): 50.1 ml  LA Volume Index (BP): 27.8 ml/m2  RWT: 0.40     Doppler Measurements & Calculations  MV E max marlena: 72.7 cm/sec  MV A max marlena: 95.7 cm/sec  MV E/A: 0.76  MV dec slope: 280.9 cm/sec2  LV V1 max P.3 mmHg  LV V1 max: 75.5 cm/sec  LV V1 VTI: 18.2 cm  SV(LVOT): 59.5 ml  SI(LVOT): 33.0 ml/m2  PA acc time: 0.12 sec  TR max marlena: 238.9 cm/sec  TR max P.8 mmHg  E/E' avg: 10.2  Lateral E/e': 6.2  Medial E/e': 14.2     ______________________________________________________________________________  Report approved by: Larisa Vazquez 2022 03:29 PM           Medications     heparin 1,050 Units/hr (22 2100)     - MEDICATION INSTRUCTIONS -         aspirin  81 mg Oral Daily     fluticasone  2 spray Both Nostrils At Bedtime     lisinopril  10 mg Oral Daily     metoprolol tartrate  25 mg Oral BID     sertraline  100 mg Oral Daily     sodium chloride (PF)  3 mL Intracatheter Q8H

## 2022-11-20 NOTE — PROGRESS NOTES
"[facility] Cardiology Progress Note            Interval History:   Admitted with palpitations, high blood pressure as high as 230 and raised troponin.  By history ST changes in the inferolateral leads.  EKG here showed nonspecific ST changes as described in my previous note.  Echocardiogram does not show regional wall motion abnormalities.  Troponin is beginning to wane.  Hypertensive.  No pain.              Medications:       aspirin  81 mg Oral Daily     fluticasone  2 spray Both Nostrils At Bedtime     lisinopril  10 mg Oral Daily     metoprolol tartrate  12.5 mg Oral BID     sertraline  100 mg Oral Daily     sodium chloride (PF)  3 mL Intracatheter Q8H               Physical Exam:   Blood pressure (!) 152/64, pulse 78, temperature 98.1  F (36.7  C), temperature source Oral, resp. rate 18, height 1.6 m (5' 3\"), weight 77.6 kg (171 lb 1.6 oz), SpO2 97 %.  Rhythm: Sinus rhythmConstitutional:   awake, alert, cooperative, no apparent distress, and appears stated age     Neck:   no jugular venous distension and no carotid bruits     Lungs:   No increased work of breathing, good air exchange, clear to auscultation bilaterally, no crackles or wheezing     Cardiovascular:   regular rate and rhythm, normal S1 and S2, S4 present, murmurs include systolic murmur II/VI located at right upper sternal border without radiation and no edema               Data:          Lab Results   Component Value Date     11/20/2022     11/19/2022    Lab Results   Component Value Date    CHLORIDE 108 11/20/2022    CHLORIDE 106 11/19/2022    Lab Results   Component Value Date    BUN 11 11/20/2022    BUN 8 11/19/2022      Lab Results   Component Value Date    POTASSIUM 3.8 11/20/2022    POTASSIUM 4.3 11/19/2022    Lab Results   Component Value Date    CO2 26 11/20/2022    CO2 29 11/19/2022    Lab Results   Component Value Date    CR 0.53 11/20/2022    CR 0.54 11/19/2022        Lab Results   Component Value Date    HGB 12.4 " 11/20/2022    HGB 14.0 11/19/2022     Lab Results   Component Value Date    WBC 7.1 11/20/2022    WBC 7.5 11/19/2022                   Assessment and Plan:   Assessment:   Problem List  1.  NSTEMI (non-ST elevated myocardial infarction) (H).  Possibly demand myocardial infarct.  However from the emergency room notes there was some dynamic ST changes in the outside hospital.  No chest discomfort however.  No regional wall motion abnormalities.  2.  Essential hypertension  3.  Anxiety    * No resolved hospital problems. *       Plan:   1.  Increase metoprolol to tartrate to 25 mg twice a day.  Titrate up metoprolol as per heart rate and blood pressure.  May also titrate up lisinopril but would prefer to go ahead with the Metroprolol first given the possibility of acute coronary syndrome.  2.  Initially recommended coronary angiography to the patient.  However the patient was resistant to this plan and asked for alternatives.  CT angiography would be the best alternative in this situation.  Explained to that coronary angiography is the gold standard but a negative CT coronary angiogram is 99% accurate.  Also explained that if the heart rate remained high or there was a significant amount of calcification the CT scan would not be the best study.  Also explained that we can perform noninvasive studies such as Lexiscan or the accuracy would be about 89%.  In the end the patient opted for the CT coronary angiogram which we will perform tomorrow.        Attestation:  I have reviewed today's vital signs, notes, medications, labs and imaging.  Care coordination / counseling time: 25 minutes  Total time: 35 minutes         Meño Madrigal MD, MD 11/20/2022  7:34 AM

## 2022-11-21 ENCOUNTER — APPOINTMENT (OUTPATIENT)
Dept: CARDIOLOGY | Facility: CLINIC | Age: 72
DRG: 281 | End: 2022-11-21
Attending: INTERNAL MEDICINE
Payer: MEDICARE

## 2022-11-21 VITALS
WEIGHT: 170.4 LBS | RESPIRATION RATE: 20 BRPM | OXYGEN SATURATION: 97 % | HEIGHT: 63 IN | HEART RATE: 64 BPM | TEMPERATURE: 97.6 F | BODY MASS INDEX: 30.19 KG/M2 | DIASTOLIC BLOOD PRESSURE: 65 MMHG | SYSTOLIC BLOOD PRESSURE: 139 MMHG

## 2022-11-21 LAB
CHOLEST SERPL-MCNC: 175 MG/DL
CREAT SERPL-MCNC: 0.63 MG/DL (ref 0.52–1.04)
GFR SERPL CREATININE-BSD FRML MDRD: >90 ML/MIN/1.73M2
HDLC SERPL-MCNC: 47 MG/DL
LDLC SERPL CALC-MCNC: 106 MG/DL
NONHDLC SERPL-MCNC: 128 MG/DL
TRIGL SERPL-MCNC: 112 MG/DL
UFH PPP CHRO-ACNC: 0.39 IU/ML

## 2022-11-21 PROCEDURE — 80061 LIPID PANEL: CPT | Performed by: PHYSICIAN ASSISTANT

## 2022-11-21 PROCEDURE — 250N000013 HC RX MED GY IP 250 OP 250 PS 637: Performed by: INTERNAL MEDICINE

## 2022-11-21 PROCEDURE — 99239 HOSP IP/OBS DSCHRG MGMT >30: CPT | Performed by: INTERNAL MEDICINE

## 2022-11-21 PROCEDURE — 250N000013 HC RX MED GY IP 250 OP 250 PS 637: Performed by: PHYSICIAN ASSISTANT

## 2022-11-21 PROCEDURE — G1010 CDSM STANSON: HCPCS | Performed by: INTERNAL MEDICINE

## 2022-11-21 PROCEDURE — 36415 COLL VENOUS BLD VENIPUNCTURE: CPT | Performed by: INTERNAL MEDICINE

## 2022-11-21 PROCEDURE — 85520 HEPARIN ASSAY: CPT | Performed by: INTERNAL MEDICINE

## 2022-11-21 PROCEDURE — 250N000009 HC RX 250: Performed by: INTERNAL MEDICINE

## 2022-11-21 PROCEDURE — 99233 SBSQ HOSP IP/OBS HIGH 50: CPT | Mod: FS | Performed by: PHYSICIAN ASSISTANT

## 2022-11-21 PROCEDURE — 250N000011 HC RX IP 250 OP 636: Performed by: INTERNAL MEDICINE

## 2022-11-21 PROCEDURE — 75574 CT ANGIO HRT W/3D IMAGE: CPT | Mod: 26 | Performed by: INTERNAL MEDICINE

## 2022-11-21 PROCEDURE — 82565 ASSAY OF CREATININE: CPT | Performed by: INTERNAL MEDICINE

## 2022-11-21 PROCEDURE — 75574 CT ANGIO HRT W/3D IMAGE: CPT | Mod: MG

## 2022-11-21 RX ORDER — METOPROLOL TARTRATE 50 MG
50-100 TABLET ORAL
Status: DISCONTINUED | OUTPATIENT
Start: 2022-11-21 | End: 2022-11-21 | Stop reason: HOSPADM

## 2022-11-21 RX ORDER — IOPAMIDOL 755 MG/ML
500 INJECTION, SOLUTION INTRAVASCULAR ONCE
Status: COMPLETED | OUTPATIENT
Start: 2022-11-21 | End: 2022-11-21

## 2022-11-21 RX ORDER — METOPROLOL TARTRATE 50 MG
50 TABLET ORAL 2 TIMES DAILY
Status: DISCONTINUED | OUTPATIENT
Start: 2022-11-21 | End: 2022-11-21 | Stop reason: HOSPADM

## 2022-11-21 RX ORDER — METOPROLOL TARTRATE 50 MG
50-100 TABLET ORAL
Status: COMPLETED | OUTPATIENT
Start: 2022-11-21 | End: 2022-11-21

## 2022-11-21 RX ORDER — METOPROLOL TARTRATE 1 MG/ML
5-15 INJECTION, SOLUTION INTRAVENOUS
Status: DISCONTINUED | OUTPATIENT
Start: 2022-11-21 | End: 2022-11-21

## 2022-11-21 RX ORDER — METOPROLOL TARTRATE 50 MG
50 TABLET ORAL 2 TIMES DAILY
Qty: 60 TABLET | Refills: 0 | Status: SHIPPED | OUTPATIENT
Start: 2022-11-21

## 2022-11-21 RX ORDER — NITROGLYCERIN 0.4 MG/1
0.4 TABLET SUBLINGUAL
Status: DISCONTINUED | OUTPATIENT
Start: 2022-11-21 | End: 2022-11-21

## 2022-11-21 RX ORDER — METOPROLOL TARTRATE 25 MG/1
25 TABLET, FILM COATED ORAL ONCE
Status: COMPLETED | OUTPATIENT
Start: 2022-11-21 | End: 2022-11-21

## 2022-11-21 RX ORDER — ATORVASTATIN CALCIUM 40 MG/1
40 TABLET, FILM COATED ORAL DAILY
Qty: 30 TABLET | Refills: 0 | Status: SHIPPED | OUTPATIENT
Start: 2022-11-21

## 2022-11-21 RX ADMIN — ASPIRIN 81 MG: 81 TABLET, COATED ORAL at 08:26

## 2022-11-21 RX ADMIN — METOPROLOL TARTRATE 50 MG: 50 TABLET, FILM COATED ORAL at 10:59

## 2022-11-21 RX ADMIN — NITROGLYCERIN 0.4 MG: 0.4 TABLET SUBLINGUAL at 12:49

## 2022-11-21 RX ADMIN — METOPROLOL TARTRATE 10 MG: 5 INJECTION INTRAVENOUS at 12:42

## 2022-11-21 RX ADMIN — METOPROLOL TARTRATE 10 MG: 5 INJECTION INTRAVENOUS at 12:33

## 2022-11-21 RX ADMIN — IOPAMIDOL 110 ML: 755 INJECTION, SOLUTION INTRAVENOUS at 12:59

## 2022-11-21 RX ADMIN — METOPROLOL TARTRATE 25 MG: 25 TABLET, FILM COATED ORAL at 08:26

## 2022-11-21 RX ADMIN — LISINOPRIL 10 MG: 10 TABLET ORAL at 08:26

## 2022-11-21 RX ADMIN — METOPROLOL TARTRATE 25 MG: 25 TABLET, FILM COATED ORAL at 09:13

## 2022-11-21 RX ADMIN — METOPROLOL TARTRATE 10 MG: 5 INJECTION INTRAVENOUS at 12:38

## 2022-11-21 RX ADMIN — SERTRALINE HYDROCHLORIDE 100 MG: 100 TABLET ORAL at 08:26

## 2022-11-21 RX ADMIN — METOPROLOL TARTRATE 10 MG: 5 INJECTION INTRAVENOUS at 12:46

## 2022-11-21 ASSESSMENT — ACTIVITIES OF DAILY LIVING (ADL)
ADLS_ACUITY_SCORE: 18

## 2022-11-21 NOTE — PLAN OF CARE
"Goal Outcome Evaluation:       Neuro- AOR X 4- high anxiety, nervousness, refused ativan or seroquel  Most Recent Vitals- Vitals: BP (!) 144/63   Pulse 76   Temp 98.1  F (36.7  C) (Oral)   Resp 18   Ht 1.6 m (5' 3\")   Wt 77.6 kg (171 lb 1.6 oz)   SpO2 94%   BMI 30.31 kg/m    BMI= Body mass index is 30.31 kg/m .    Tele/Cardiac- NSR  Resp- RA- hx of CIERRA, CPAP at noc  Activity-  Up ad babita in the rm  Pain- Denies CP or anyother pain  Drips- Hep gtt, recheck ANtiXa in am  Drains/Tubes- n/a  Skin- intact xpt iv stick sites   GI/- Cont of b/b  Aggression Color-  Green- just has high anxiety  Plan- CTAngio Mon, would like to go home if negative.   Shift summary  Did well today, up in the room, very anxious, \"jumpy,\" needs multiple explanation for the same thing from different people. Family updated.                 "

## 2022-11-21 NOTE — DISCHARGE SUMMARY
[facility]  Hospitalist Discharge Summary      Date of Admission:  11/19/2022  Date of Discharge:  11/21/2022  Discharging Provider: Ele Chinchilla MD  Discharge Service: Hospitalist Service    Discharge Diagnoses   Type II non-ST elevation MI  Hypertensive urgency with Uncontrolled hypertension  Sleep apnea  Anxiety with panic attacks  History of PVCs  Hyperlipidemia  Prediabetes  Obstructive sleep apnea    Follow-ups Needed After Discharge   Follow-up Appointments     Follow-up and recommended labs and tests       Follow up with primary care provider, Physician No Ref-Primary, within 7   days for hospital follow- up.  Recommend stress test to be done through PCP           Unresulted Labs Ordered in the Past 30 Days of this Admission     No orders found from 10/20/2022 to 11/20/2022.          Discharge Disposition   Discharged to home  Condition at discharge: Stable    Hospital Course   MECHELLE Yu is a very pleasant 72 year old female with a past medical history significant for dyslipidemia, hypertension, obstructive sleep apnea, palpitations, prediabetes, heart murmur, anxiety with panic disorder, amongst others who presented to an outside hospital emergency department with complaints of palpitations and elevated blood pressure.  Pt woke up early with a sense of palpitations, like her heart was beating hard.  No chest pain.  She checked her BP at home and it was in the 200s systolic.  She presented to the ED for further evaluation.  Reportedly had EKG changes with ST depressions in inferior and lateral leads, along with troponin elevation.  Transferred to [facility] for management of NSTEMI     NSTEMI  Hypertensive urgency  Hyperlipidemia  Patient presented with palpitations and elevated blood pressures at home.  Patient's EKG reportedly showed 1 mm ST segment depression in leads II, aVR, V4, V5, V6.  With 1-2 mm ST segment depression in aVF.  Initial troponin negative, up to  112 on repeat.  -Troponin peaked to 286 and has started downtrending  - Cardiology following, appreciate input  -Echocardiogram with normal EF, no wall motion abnormality  -Treated with IV heparin  -Patient was given option of coronary angiogram versus CT coronaries, patient chose CT coronaries which was negative for significant blockages.  However it was unable to be completely visualized diagonal and mid to distal circumflex, but given no WMA and significant EKG changes cardiology recommended medical management for nonocclusive CAD.  -- Continue ASA 81 mg daily, PTA lisinopril was resumed and patient was also started on metoprolol with dose adjustment to 50 mg twice daily with good blood pressure control   -Cardiology also recommended discharging patient on Lipitor 40 mg and Lexiscan stress test through PCP.  -Renal artery ultrasound negative for any obstruction  -- Recent lipid panel and hemoglobin A1c values in care everywhere.              --Hemoglobin A1c 5.4% as of 10/26/2022.  , , HDL 45,      History of palpitations  History of PVCs  --Monitor on telemetry  -Continue metoprolol as above     Obstructive sleep apnea  -- CPAP while sleeping with home settings     Generalized anxiety with history of panic disorder  Patient intermittently appears to be slightly anxious,   -Refusing as needed benzodiazepine, offered her low-dose Atarax/Seroquel which currently she is refusing  -- Continue PTA Zoloft  -Outpatient follow-up with PCP     Prediabetes  Hemoglobin A1c 5.4% as of 10/26/2022  --Monitor routinely with PCP       Consultations This Hospital Stay   PHARMACY IP CONSULT  PHARMACY IP CONSULT  CARE MANAGEMENT / SOCIAL WORK IP CONSULT  CARDIOLOGY IP CONSULT    Code Status   Full Code    Time Spent on this Encounter   I, Ele Chinchilla MD, personally saw the patient today and spent greater than 30 minutes discharging this patient.       Ele Chinchilla MD  [facility] HEART  [place] [address]  [place] [address]  Phone: [phone]  ______________________________________________________________________    Physical Exam   Vital Signs: Temp: 97.6  F (36.4  C) Temp src: Oral BP: 139/65 Pulse: 64   Resp: (P) 18 SpO2: 97 % O2 Device: None (Room air)    Weight: 170 lbs 6.4 oz  Exam:  Constitutional: Awake, alert and no distress. Appears comfortable  Head: Normocephalic. No masses, lesions, tenderness or abnormalities  ENT: ENT exam normal, no neck nodes or sinus tenderness  Cardiovascular: RRR.  No murmurs, no rubs or JVD  Respiratory: Normal WOB,b/l equal air entry, no wheezes or crackles   Gastrointestinal: Abdomen soft, non-tender. BS normal. No masses, organomegaly  : Deferred  Extremities : No edema , no clubbing or cyanosis    Neurologic: Cranial nerves II-XII grossly intact , power symmetrical, Reflexes normal and symmetric. Sensation grossly WNL.         Primary Care Physician   Physician No Ref-Primary    Discharge Orders      Reason for your hospital stay    Type II non-ST elevation MI likely secondary to uncontrolled hypertension     Activity    Your activity upon discharge: activity as tolerated     Monitor and record    blood pressure and pulse daily and readings to PCP for any medication adjustment     Discharge Instructions    You can try as needed Atarax/hydroxyzine or low-dose Seroquel for anxiety, discussed with your PCP     Follow-up and recommended labs and tests     Follow up with primary care provider, Physician No Ref-Primary, within 7 days for hospital follow- up.  Recommend stress test to be drawn through PCP     Diet    Follow this diet upon discharge: Orders Placed This Encounter      NPO for Medical/Clinical Reasons Except for: Meds       Significant Results and Procedures   Results for orders placed or performed during the hospital encounter of 11/19/22   US Renal Complete w Arterial Duplex    Narrative    EXAM:  1. RENAL ULTRASOUND   2. RENAL  DUPLEX  LOCATION: [facility]  DATE: 2022.    INDICATION: Severe hypertension. ANGELA ?  COMPARISON: None.  TECHNIQUE: Duplex imaging is performed utilizing gray-scale, two-dimensional images, and color-flow imaging. Doppler waveform analysis and spectral Doppler imaging is also performed.    FINDINGS:     RIGHT KIDNEY: 10.5 x 5.2 x 4.8 cm. Normal without hydronephrosis or masses.    LEFT KIDNEY 9.8 x 4.7 x 5.4 cm. Normal without hydronephrosis or masses. Simple cyst requiring no follow-up..     BLADDER: Decompressed.    RENAL DUPLEX:  Aortic PSV: 104 cm/s, multiphasic  Right Renal Artery PSV: Normal, less than 200 cm/s (Normal considered less than 200 cm/s.)  Right Intrarenal Resistive Index: Normal, 0.7 or less  Left Renal Artery PSV Normal, less than 200 cm/s (Normal considered less than 200 cm/s.)  Left Intrarenal Resistive Index: Normal, 0.7 or less      Impression    IMPRESSION:   1.  Negative renal ultrasound with Doppler.     Echocardiogram Complete     Value    LVEF  60-65%    Narrative    [id number]  Pending sale to [facility]  [id number]  [id number]^^FLOWER^JEANA     [facility]  Echocardiography Laboratory  [address] [place] [address]     Name: CARMELO MADISON  MRN: [id number]  : 1950  Study Date: 2022 02:27 PM  Age: 72 yrs  Gender: Female  Patient Location: [facility]  Reason For Study: MI - Acute  Ordering Physician: FLOWER ALANIS  Referring Physician: SYSTEM, PROVIDER NOT IN  Performed By: Jen Lundy     BSA: 1.8 m2  Height: 63 in  Weight: 170 lb  HR: 81  BP: 142/63 mmHg  ______________________________________________________________________________  Procedure  Complete Portable Echo Adult. Optison (NDC #3830-1841) given intravenously.  ______________________________________________________________________________  Interpretation Summary     The visual ejection fraction is 60-65%.  Left ventricular systolic function is normal.  No  regional wall motion abnormalities noted.  The study was technically difficult.  ______________________________________________________________________________  Left Ventricle  The left ventricle is normal in size. There is normal left ventricular wall  thickness. Diastolic Doppler findings (E/E' ratio and/or other parameters)  suggest left ventricular filling pressures are indeterminate. The visual  ejection fraction is 60-65%. Left ventricular systolic function is normal.  Grade I or early diastolic dysfunction. No regional wall motion abnormalities  noted.     Right Ventricle  The right ventricle is normal in size and function.     Atria  Normal left atrial size. Right atrial size is normal. There is no color  Doppler evidence of an atrial shunt.     Mitral Valve  There is trace mitral regurgitation.     Tricuspid Valve  There is trace tricuspid regurgitation. The right ventricular systolic  pressure is approximated at 22.8 mmHg plus the right atrial pressure.     Aortic Valve  The aortic valve is trileaflet. There is moderate trileaflet aortic sclerosis.  No aortic regurgitation is present. No hemodynamically significant valvular  aortic stenosis.     Pulmonic Valve  There is no pulmonic valvular regurgitation. Normal pulmonic valve velocity.     Vessels  The aortic root is normal size. Normal size ascending aorta. IVC diameter <2.1  cm collapsing >50% with sniff suggests a normal RA pressure of 3 mmHg.     Pericardium  There is no pericardial effusion.     Rhythm  Sinus rhythm was noted.  ______________________________________________________________________________  MMode/2D Measurements & Calculations  IVSd: 0.97 cm     LVIDd: 4.7 cm  LVIDs: 2.9 cm  LVPWd: 0.93 cm  FS: 38.6 %  LV mass(C)d: 153.3 grams  LV mass(C)dI: 84.9 grams/m2  Ao root diam: 2.8 cm  LA dimension: 2.7 cm  asc Aorta Diam: 2.5 cm  LA/Ao: 0.97  LVOT diam: 2.0 cm  LVOT area: 3.3 cm2  LA Volume (BP): 50.1 ml  LA Volume Index (BP): 27.8  ml/m2  RWT: 0.40     Doppler Measurements & Calculations  MV E max marlena: 72.7 cm/sec  MV A max marlena: 95.7 cm/sec  MV E/A: 0.76  MV dec slope: 280.9 cm/sec2  LV V1 max P.3 mmHg  LV V1 max: 75.5 cm/sec  LV V1 VTI: 18.2 cm  SV(LVOT): 59.5 ml  SI(LVOT): 33.0 ml/m2  PA acc time: 0.12 sec  TR max marlena: 238.9 cm/sec  TR max P.8 mmHg  E/E' avg: 10.2  Lateral E/e': 6.2  Medial E/e': 14.2     ______________________________________________________________________________  Report approved by: Larisa Vazquez 2022 03:29 PM         CTA  ANGIOGRAM CORONARY ARTERY    Narrative    Procedure: CT ANGIO CORONARY ARTERY   Examination Date:     Indication:  Positive troponin levels palpitations and hypertension.     Clinical Information: chest pain, abnormal stress echo    Ordering Physician: Dr Tha Pino     Overall quality of the study: Adequate.     PROCEDURE:The patient was positioned in the scanner gantry and an IV  was started using an 18 gauge IV in the right antecubital fossa.   Utilizing 110 cc  Isovue 370, wasted 0 cc, multi-slice computed  tomography was performed with a Siemens Dual Source Flash scanner  without incident. Beta-blockers were required to optimize heart rate,  patient was given Metoprolol 100 mg Oral, Metoprolol 40 mg  IV. The  patient was given pre-medication of sublingual Nitrostat 0.4 mg prior  to scanning. Coronary artery calcium score was performed using the  Flash scanner protocol. CTA was performed in the spiral mode at a  heart rate of 67 bpm with 100 kVp. Images were reconstructed and  analyzed on a [employer] workstation. Scan protocol was optimized to  minimize radiation exposure. The total radiation exposure including  calcium score was calculated to be 298 DLP, and 4.72 mSv.      Impression    IMPRESSION:    1. Total Agatston score 106, placing the patient in the 73rd  percentile when compared to age and gender matched control group.    2. No significant obstructive disease  though mid and distal  circumflex, diagonal branches and PDA were not well seen in this  study.     3.  Please review Radiology report for incidental noncardiac findings  that  will follow separately.        FINDINGS:    CORONARY CALCIUM SCORE: The total Agatston calcium score is 106, Left  main: 0, left anterior descendin,  circumflex: 0, right coronary  artery: 14.8. This places the patient in the 73rd percentile when  compared to age and gender matched control group.    CORONARY CT ANGIOGRAPHY    DOMINANCE: Right dominant system.     LEFT MAIN: The left main arises normally from the left coronary cusp  and is widely patent without any stenosis or plaque.    LEFT ANTERIOR DESCENDING: Mildly stenosed proximally due to the  presence of a predominantly calcified plaque. Remainder of the vessel  shows diffuse luminal irregularities only.. Diagonal vessels not well  seen.     CIRCUMFLEX: Short proximal circumflex artery, large OM branch which is  free of significant disease. The mid and distal circumflex not clearly  seen.    RIGHT CORONARY ARTERY: Mild stenosis proximally to the presence of a  small mixed plaque. Remainder of the vessel shows luminal  irregularities only. CTA are well seen.    ADDITIONAL FINDINGS:     The proximal ascending aorta is normal in size.     Normal pulmonary venous anatomy with all four pulmonary veins draining  into the left atrium.      There is no left ventricular mass or thrombus.     Normal pericardial thickness. There is no pericardial effusion.    The proximal pulmonary arteries are well opacified.    Please review Radiology report for incidental noncardiac findings that  will follow separately.    FLOWER DIALLO MD         SYSTEM ID:  [id number]       Discharge Medications   Current Discharge Medication List      START taking these medications    Details   atorvastatin (LIPITOR) 40 MG tablet Take 1 tablet (40 mg) by mouth daily  Qty: 30 tablet, Refills: 0    Associated Diagnoses:  Uncontrolled hypertension; NSTEMI (non-ST elevated myocardial infarction) (H)      metoprolol tartrate (LOPRESSOR) 50 MG tablet Take 1 tablet (50 mg) by mouth 2 times daily  Qty: 60 tablet, Refills: 0    Comments: Future refills by PCP  Physician No Ref-Primary with phone number None.  Associated Diagnoses: Uncontrolled hypertension         CONTINUE these medications which have NOT CHANGED    Details   aspirin 81 MG EC tablet Take 81 mg by mouth daily      bulk laxative (BENEFIBER DRINK MIX) packet Take by mouth daily 1.5 teaspoonfuls      cholecalciferol (VITAMIN D3) 25 mcg (1000 units) capsule Take 1 capsule by mouth daily      Cranberry 500 MG TABS Take 500 mg by mouth daily      fluticasone (FLONASE) 50 MCG/ACT nasal spray Spray 2 sprays into both nostrils At Bedtime      lisinopril (ZESTRIL) 10 MG tablet Take 10 mg by mouth daily      loratadine (CLARITIN) 10 MG tablet Take 10 mg by mouth daily as needed for allergies      multivitamin, therapeutic (THERA-VIT) TABS tablet Take 1 tablet by mouth daily      Omega-3 Fatty Acids (FISH OIL) 1200 MG capsule Take 2,400 mg by mouth daily      sertraline (ZOLOFT) 100 MG tablet Take 100 mg by mouth daily      vitamin C (ASCORBIC ACID) 500 MG tablet Take 1,000 mg by mouth daily      zinc sulfate (ZINCATE) 220 (50 Zn) MG capsule Take 220 mg by mouth daily (50 mg elemental zinc)           Allergies   Allergies   Allergen Reactions     Cephalexin Itching     Ibuprofen GI Disturbance

## 2022-11-21 NOTE — PROGRESS NOTES
[facility]  Cardiology Progress Note  Date of Service: 11/21/2022  Primary Cardiologist: will be Dr. Tha Pino    Assessment & Plan    Heather Yu is a 72 year old female with past medical history significant for HTN, severe anxiety, HLD, CIERRA (not fully treated) who presented to [facility] with heart pounding and HTN >200.  Found to have + trop and admitted on 11/19/2022 for further w/u.      Assessment:  1.  Hypertensive emergency, patient was taking medications at home, unclear cause of exacerbation.  Labile bp here.      2.  NSTEMI, trop 256 on arrival and now downtrending.  Unclear if type II demand ischemia but the patient is asymptomatic.  She was offered coronary angiogram and declined is going for CTA today.  If CTA shows severe lesions will consider angiogram.  Echo without wall motion is reassuring. Unclear lipid status, will get labs.    4.  Sleep apnea, patient admits to having difficulty tolerating her mask, she is willing to get this worked up again and work on this further.    5.  Anxiety with panic attacks, per hospitalist.    6. Palpitations, tele here shows few pvcs    Plan:   1. Increase Lopressor to 50 mg twice daily, additional 25 mg already given.  Additional doses per CTA protocol.  Likely will switch to Coreg for better blood pressure control.  2. Add on lipid panel  3. Continue heparin drip and daily aspirin  4. Additional recommendations pending CTA    Aixa Marino PA-C  [facility] Heart  Pager: [phone]     I spent 30 minutes face-to-face and/or coordinating care of Heather Yu. Over 50% of our time on the unit was spent counseling the patient and/or coordinating care regarding her cardiac condition.    Interval History   No additional heart pounding she denies chest pain or shortness of breath now or previously.  She has not had syncope or presyncope.  She is very anxious about the study today and anxious to get home.  She has been reliable with her  "medications.    Roslyn has a history of skin cancer and is getting an PET scan tomorrow at [facility].    Physical Exam   Temp: 97.6  F (36.4  C) Temp src: Oral BP: (!) 185/77 Pulse: 74   Resp: 20 SpO2: 97 % O2 Device: None (Room air)    Vitals:    11/19/22 1305 11/20/22 0555 11/21/22 0610   Weight: 77.3 kg (170 lb 6.4 oz) 77.6 kg (171 lb 1.6 oz) 77.3 kg (170 lb 6.4 oz)     Well-developed well-nourished anxious appearing woman in no acute distress.  Normocephalic atraumatic.  Heart is regular with 2 out of 6 systolic murmur heard best at left sternal border.  Lungs are clear without wheezes rales or rhonchi.  Clinically Significant Risk Factors Present on Admission            # Obesity: Estimated body mass index is 30.19 kg/m  as calculated from the following:    Height as of this encounter: 1.6 m (5' 3\").    Weight as of this encounter: 77.3 kg (170 lb 6.4 oz).    HTN         Medications     heparin 1,050 Units/hr (11/20/22 1731)     - MEDICATION INSTRUCTIONS -         aspirin  81 mg Oral Daily     fluticasone  2 spray Both Nostrils At Bedtime     lisinopril  10 mg Oral Daily     metoprolol tartrate  50 mg Oral BID     sertraline  100 mg Oral Daily     sodium chloride (PF)  10 mL Intravenous Once     sodium chloride (PF)  10 mL Intravenous Once     sodium chloride (PF)  3 mL Intracatheter Q8H       Data   Last 24 hours labs reviewed   EKG: (reviewed personally) sinus with lvh and upsloping st depression in inferior leads     Imaging: none    Tele: sinus with few pvcs    Echo:   The visual ejection fraction is 60-65%.  Left ventricular systolic function is normal.  No regional wall motion abnormalities noted.  The study was technically difficult.    Last ischemic eval: none    Device: none    "

## 2022-11-21 NOTE — PROGRESS NOTES
Brief Cardiology Note  Pt: Heather Yu    1950       Heather Yu is a 72 year old female with past medical history significant for HTN, severe anxiety, HLD, CIERRA (not fully treated) who presented to [facility] with heart pounding and HTN >200.  Found to have + trop and admitted on 2022 for further w/u.  Trop peak at 256, LDL elev at 106.    Coronary CT shows:  1. Total Agatston score 106, placing the patient in the 73rd  percentile when compared to age and gender matched control group.     2. No significant obstructive disease though mid and distal  circumflex, diagonal branches and PDA were not well seen in this  study.     A/P  Hypertensive emergency with demand type II infarct most likely.  Unable to completely visualize diagonal and mid- distal circ, but given no WMA and significant ECG changes pt needs medical management for non occlusive CAD.      Pt should discharge on asa 81 mg daily + lipitor 40.    Pt can follow-up with pcp, should undergo lexiscan stress test if she develops cardiac sx.      Aixa Marino PA-C  3:24 PM 2022   [facility] Heart  Pager: [phone]

## 2022-11-22 LAB
ATRIAL RATE - MUSE: 78 BPM
DIASTOLIC BLOOD PRESSURE - MUSE: NORMAL MMHG
INTERPRETATION ECG - MUSE: NORMAL
P AXIS - MUSE: 66 DEGREES
PR INTERVAL - MUSE: 148 MS
QRS DURATION - MUSE: 78 MS
QT - MUSE: 386 MS
QTC - MUSE: 440 MS
R AXIS - MUSE: 74 DEGREES
SYSTOLIC BLOOD PRESSURE - MUSE: NORMAL MMHG
T AXIS - MUSE: 71 DEGREES
VENTRICULAR RATE- MUSE: 78 BPM
